# Patient Record
Sex: FEMALE | Race: BLACK OR AFRICAN AMERICAN | NOT HISPANIC OR LATINO | Employment: FULL TIME | URBAN - METROPOLITAN AREA
[De-identification: names, ages, dates, MRNs, and addresses within clinical notes are randomized per-mention and may not be internally consistent; named-entity substitution may affect disease eponyms.]

---

## 2017-03-23 PROBLEM — I48.91 NEW ONSET A-FIB: Status: ACTIVE | Noted: 2017-03-23

## 2017-03-23 PROBLEM — Z34.90 TERM PREGNANCY: Status: ACTIVE | Noted: 2017-03-23

## 2017-03-23 PROBLEM — O14.93 PRE-ECLAMPSIA IN THIRD TRIMESTER: Status: ACTIVE | Noted: 2017-03-23

## 2017-03-23 PROBLEM — O99.210 OBESITY AFFECTING PREGNANCY: Status: ACTIVE | Noted: 2017-03-23

## 2017-03-24 PROBLEM — I48.91 NEW ONSET A-FIB: Status: RESOLVED | Noted: 2017-03-23 | Resolved: 2017-03-24

## 2017-03-25 PROBLEM — I48.0 PAROXYSMAL ATRIAL FIBRILLATION: Status: ACTIVE | Noted: 2017-03-23

## 2017-03-30 PROBLEM — D64.9 ANEMIA: Status: ACTIVE | Noted: 2017-03-30

## 2017-04-01 PROBLEM — K35.30 ACUTE APPENDICITIS WITH LOCALIZED PERITONITIS: Status: ACTIVE | Noted: 2017-04-01

## 2017-04-01 PROBLEM — I10 HTN (HYPERTENSION): Status: ACTIVE | Noted: 2017-04-01

## 2017-06-26 PROBLEM — O14.93 PRE-ECLAMPSIA IN THIRD TRIMESTER: Status: RESOLVED | Noted: 2017-03-23 | Resolved: 2017-06-26

## 2018-09-17 ENCOUNTER — OFFICE VISIT (OUTPATIENT)
Dept: URGENT CARE | Facility: CLINIC | Age: 23
End: 2018-09-17
Payer: MEDICAID

## 2018-09-17 VITALS
DIASTOLIC BLOOD PRESSURE: 94 MMHG | OXYGEN SATURATION: 99 % | HEIGHT: 71 IN | HEART RATE: 94 BPM | SYSTOLIC BLOOD PRESSURE: 127 MMHG | TEMPERATURE: 98 F | BODY MASS INDEX: 41.02 KG/M2 | RESPIRATION RATE: 18 BRPM | WEIGHT: 293 LBS

## 2018-09-17 DIAGNOSIS — R11.2 NON-INTRACTABLE VOMITING WITH NAUSEA, UNSPECIFIED VOMITING TYPE: Primary | ICD-10-CM

## 2018-09-17 PROCEDURE — 99203 OFFICE O/P NEW LOW 30 MIN: CPT | Mod: S$GLB,,, | Performed by: FAMILY MEDICINE

## 2018-09-17 RX ORDER — PROMETHAZINE HYDROCHLORIDE 25 MG/ML
25 INJECTION, SOLUTION INTRAMUSCULAR; INTRAVENOUS
Status: COMPLETED | OUTPATIENT
Start: 2018-09-17 | End: 2018-09-17

## 2018-09-17 RX ORDER — PROMETHAZINE HYDROCHLORIDE 25 MG/1
25 TABLET ORAL EVERY 4 HOURS
Qty: 20 TABLET | Refills: 0 | Status: ON HOLD | OUTPATIENT
Start: 2018-09-17 | End: 2019-03-18 | Stop reason: HOSPADM

## 2018-09-17 RX ADMIN — PROMETHAZINE HYDROCHLORIDE 25 MG: 25 INJECTION, SOLUTION INTRAMUSCULAR; INTRAVENOUS at 02:09

## 2018-09-17 NOTE — PROGRESS NOTES
"Subjective:       Patient ID: Abena Arango is a 23 y.o. female.    Vitals:  height is 5' 11" (1.803 m) and weight is 133.4 kg (294 lb). Her temperature is 97.9 °F (36.6 °C). Her blood pressure is 127/94 (abnormal) and her pulse is 94. Her respiration is 18 and oxygen saturation is 99%.     Chief Complaint: Emesis    Patient has been nauseated, vomiting and constipated since Wednesday. Has been taking zofran.       Emesis    This is a new problem. The current episode started in the past 7 days. The problem occurs 5 to 10 times per day. The problem has been unchanged. The emesis has an appearance of bright red blood and stomach contents. There has been no fever. Associated symptoms include headaches and sweats. Pertinent negatives include no abdominal pain, chest pain, chills, diarrhea or fever. She has tried nothing for the symptoms.     Review of Systems   Constitution: Negative for chills and fever.   Cardiovascular: Negative for chest pain.   Respiratory: Negative for shortness of breath.    Musculoskeletal: Negative for back pain.   Gastrointestinal: Positive for constipation, nausea and vomiting. Negative for abdominal pain, diarrhea, hematochezia and melena.   Genitourinary: Negative for dysuria.   Neurological: Positive for headaches.       Objective:      Physical Exam   Constitutional: She is oriented to person, place, and time. She appears well-developed and well-nourished.   HENT:   Head: Normocephalic and atraumatic.   Right Ear: External ear normal.   Left Ear: External ear normal.   Nose: Nose normal.   Mouth/Throat: Mucous membranes are normal.   Eyes: Conjunctivae and lids are normal.   Neck: Trachea normal and full passive range of motion without pain. Neck supple.   Cardiovascular: Normal rate, regular rhythm and normal heart sounds.   Pulmonary/Chest: Effort normal and breath sounds normal. No respiratory distress.   Abdominal: Soft. Normal appearance and bowel sounds are normal. She exhibits no " distension, no abdominal bruit, no pulsatile midline mass and no mass. There is no tenderness.   Musculoskeletal: Normal range of motion. She exhibits no edema.   Neurological: She is alert and oriented to person, place, and time. She has normal strength.   Skin: Skin is warm, dry and intact. She is not diaphoretic. No pallor.   Psychiatric: She has a normal mood and affect. Her speech is normal and behavior is normal. Judgment and thought content normal. Cognition and memory are normal.   Nursing note and vitals reviewed.      Assessment:       1. Non-intractable vomiting with nausea, unspecified vomiting type        Plan:         Non-intractable vomiting with nausea, unspecified vomiting type    Other orders  -     promethazine injection 25 mg; Inject 1 mL (25 mg total) into the muscle one time.  -     promethazine (PHENERGAN) 25 MG tablet; Take 1 tablet (25 mg total) by mouth every 4 (four) hours.  Dispense: 20 tablet; Refill: 0

## 2018-09-17 NOTE — LETTER
September 17, 2018                   [facility]  Urgent Care  [address], [address]  [facility] [address]  Phone: [phone]  Fax: [phone]   September 17, 2018     Patient: Abena Arango   YOB: 1995   Date of Visit: 9/17/2018       To Whom it May Concern:    Abena Arango was seen in my clinic on 9/17/2018. Please excuse 9/16/18 - 9/19/18.     If you have any questions or concerns, please don't hesitate to call.    Sincerely,         Vianey Benton MA

## 2018-09-17 NOTE — PATIENT INSTRUCTIONS

## 2019-03-17 PROBLEM — E11.9 DIABETES MELLITUS, NEW ONSET: Status: ACTIVE | Noted: 2019-03-17

## 2019-03-17 PROBLEM — R11.10 VOMITING: Status: ACTIVE | Noted: 2019-03-17

## 2019-03-17 PROBLEM — R94.31 ABNORMAL ECG: Status: ACTIVE | Noted: 2019-03-17

## 2019-03-17 PROBLEM — K59.00 CONSTIPATION: Status: ACTIVE | Noted: 2019-03-17

## 2019-03-17 PROBLEM — R07.9 CHEST PAIN: Status: ACTIVE | Noted: 2019-03-17

## 2019-03-17 PROBLEM — E78.5 HYPERLIPIDEMIA: Status: ACTIVE | Noted: 2019-03-17

## 2019-03-17 PROBLEM — E66.01 SEVERE OBESITY (BMI >= 40): Status: ACTIVE | Noted: 2019-03-17

## 2019-05-02 ENCOUNTER — OFFICE VISIT (OUTPATIENT)
Dept: URGENT CARE | Facility: CLINIC | Age: 24
End: 2019-05-02
Payer: MEDICAID

## 2019-05-02 VITALS
HEART RATE: 98 BPM | OXYGEN SATURATION: 97 % | DIASTOLIC BLOOD PRESSURE: 95 MMHG | TEMPERATURE: 98 F | SYSTOLIC BLOOD PRESSURE: 142 MMHG | HEIGHT: 70 IN | BODY MASS INDEX: 39.22 KG/M2 | RESPIRATION RATE: 18 BRPM | WEIGHT: 274 LBS

## 2019-05-02 DIAGNOSIS — R11.2 NAUSEA AND VOMITING, INTRACTABILITY OF VOMITING NOT SPECIFIED, UNSPECIFIED VOMITING TYPE: ICD-10-CM

## 2019-05-02 DIAGNOSIS — E11.9 DIABETES MELLITUS, NEW ONSET: ICD-10-CM

## 2019-05-02 LAB
GLUCOSE SERPL-MCNC: 333 MG/DL (ref 70–110)
POC ANION GAP: 19 MMOL/L (ref 10–20)
POC BUN: 10 MMOL/L (ref 8–26)
POC CHLORIDE: 93 MMOL/L (ref 98–109)
POC CREATININE: 0.5 MG/DL (ref 0.6–1.3)
POC HEMATOCRIT: 45 %PCV (ref 37–47)
POC HEMOGLOBIN: 15.3 G/DL (ref 12.5–16)
POC ICA: 1.08 MMOL/L (ref 1.12–1.32)
POC POTASSIUM: 3.6 MMOL/L (ref 3.5–4.9)
POC SODIUM: 137 MMOL/L (ref 138–146)
POC TCO2: 30 MMOL/L (ref 24–29)

## 2019-05-02 PROCEDURE — 99214 OFFICE O/P EST MOD 30 MIN: CPT | Mod: S$GLB,,, | Performed by: PHYSICIAN ASSISTANT

## 2019-05-02 PROCEDURE — 99214 PR OFFICE/OUTPT VISIT, EST, LEVL IV, 30-39 MIN: ICD-10-PCS | Mod: S$GLB,,, | Performed by: PHYSICIAN ASSISTANT

## 2019-05-02 PROCEDURE — 80047 POCT CHEMISTRY PANEL: ICD-10-PCS | Mod: QW,S$GLB,, | Performed by: PHYSICIAN ASSISTANT

## 2019-05-02 PROCEDURE — 80047 BASIC METABLC PNL IONIZED CA: CPT | Mod: QW,S$GLB,, | Performed by: PHYSICIAN ASSISTANT

## 2019-05-02 RX ORDER — PROMETHAZINE HYDROCHLORIDE 25 MG/ML
25 INJECTION, SOLUTION INTRAMUSCULAR; INTRAVENOUS
Status: COMPLETED | OUTPATIENT
Start: 2019-05-02 | End: 2019-05-02

## 2019-05-02 RX ORDER — INSULIN LISPRO 100 [IU]/ML
4 INJECTION, SOLUTION INTRAVENOUS; SUBCUTANEOUS
Qty: 3 ML | Refills: 0 | Status: SHIPPED | OUTPATIENT
Start: 2019-05-02 | End: 2019-05-04 | Stop reason: SDUPTHER

## 2019-05-02 RX ORDER — INSULIN GLARGINE 100 [IU]/ML
25 INJECTION, SOLUTION SUBCUTANEOUS DAILY
Qty: 3 ML | Refills: 0 | Status: SHIPPED | OUTPATIENT
Start: 2019-05-02 | End: 2019-05-04 | Stop reason: SDUPTHER

## 2019-05-02 RX ORDER — LANCETS
1 EACH MISCELLANEOUS
Qty: 150 EACH | Refills: 3 | Status: SHIPPED | OUTPATIENT
Start: 2019-05-02 | End: 2023-12-11

## 2019-05-02 RX ORDER — PEN NEEDLE, DIABETIC 30 GX3/16"
1 NEEDLE, DISPOSABLE MISCELLANEOUS
Qty: 150 EACH | Refills: 0 | Status: SHIPPED | OUTPATIENT
Start: 2019-05-02 | End: 2023-12-11

## 2019-05-02 RX ORDER — PROMETHAZINE HYDROCHLORIDE 25 MG/1
25 TABLET ORAL EVERY 4 HOURS
Qty: 30 TABLET | Refills: 1 | Status: SHIPPED | OUTPATIENT
Start: 2019-05-02 | End: 2019-05-04

## 2019-05-02 RX ADMIN — PROMETHAZINE HYDROCHLORIDE 25 MG: 25 INJECTION, SOLUTION INTRAMUSCULAR; INTRAVENOUS at 06:05

## 2019-05-02 NOTE — LETTER
May 2, 2019      [facility] [address], [address]  [place] [place] [address]  Phone: [phone]  Fax: [phone]       Patient: Abena Arango   YOB: 1995  Date of Visit: 05/02/2019    To Whom It May Concern:    Vincent Arango  was at [facility] on 05/02/2019.  SHE  may return to work/school on 5/4/19. If you have any questions or concerns, or if I can be of further assistance, please do not hesitate to contact me.    Sincerely,    Zoe Lindquist MA

## 2019-05-02 NOTE — PROGRESS NOTES
"Subjective:       Patient ID: Abena Arango is a 23 y.o. female.    Vitals:  height is 5' 9.5" (1.765 m) and weight is 124.3 kg (274 lb). Her oral temperature is 97.8 °F (36.6 °C). Her blood pressure is 142/95 (abnormal) and her pulse is 98. Her respiration is 18 and oxygen saturation is 97%.     Chief Complaint: Emesis    Patient complains of nausea and vomiting for 2 days. She states that when she got home after work, around 3:00pm yesterday, she started vomiting.  She states that she has prescription strength zofran, which isn't helping. She states that in the past, she wasn't given an injection of phenergan, because it would make her sleepy.  She states that she was unable to sleep last night, due to being sick to her stomach.    Emesis    This is a new problem. The current episode started yesterday. The problem occurs 5 to 10 times per day. The problem has been gradually worsening. The emesis has an appearance of bile. There has been no fever. Associated symptoms include abdominal pain. Pertinent negatives include no arthralgias, chest pain, chills, coughing, diarrhea, dizziness, fever, headaches or myalgias. She has tried increased fluids for the symptoms. The treatment provided mild relief.       Constitution: Negative for chills, fatigue and fever.   HENT: Negative for congestion and sore throat.    Neck: Negative for painful lymph nodes.   Cardiovascular: Negative for chest pain and leg swelling.   Eyes: Negative for double vision and blurred vision.   Respiratory: Negative for cough and shortness of breath.    Gastrointestinal: Positive for abdominal pain, nausea and vomiting. Negative for diarrhea.   Genitourinary: Negative for dysuria, frequency, urgency and history of kidney stones.   Musculoskeletal: Negative for joint pain, joint swelling, muscle cramps and muscle ache.   Skin: Negative for color change, pale, rash and bruising.   Allergic/Immunologic: Negative for seasonal allergies.   Neurological: " Negative for dizziness, history of vertigo, light-headedness, passing out and headaches.   Hematologic/Lymphatic: Negative for swollen lymph nodes.   Psychiatric/Behavioral: Negative for nervous/anxious, sleep disturbance and depression. The patient is not nervous/anxious.        Objective:      Physical Exam   Constitutional: She is oriented to person, place, and time. She appears well-developed and well-nourished. She appears ill.   HENT:   Head: Normocephalic and atraumatic.   Right Ear: External ear normal.   Left Ear: External ear normal.   Nose: Nose normal.   Mouth/Throat: Mucous membranes are normal.   Eyes: Conjunctivae and lids are normal.   Neck: Trachea normal and full passive range of motion without pain. Neck supple.   Cardiovascular: Normal rate, regular rhythm and normal heart sounds.   Pulmonary/Chest: Effort normal and breath sounds normal. No respiratory distress.   Abdominal: Soft. Normal appearance and bowel sounds are normal. She exhibits no distension, no abdominal bruit, no pulsatile midline mass and no mass. There is tenderness in the epigastric area. There is no rigidity, no rebound, no guarding, no CVA tenderness, no tenderness at McBurney's point and negative Brito's sign.   Musculoskeletal: Normal range of motion. She exhibits no edema.   Neurological: She is alert and oriented to person, place, and time. She has normal strength.   Skin: Skin is warm, dry and intact. She is not diaphoretic. No pallor.   Psychiatric: She has a normal mood and affect. Her speech is normal and behavior is normal. Judgment and thought content normal. Cognition and memory are normal.   Nursing note and vitals reviewed.      Assessment:       1. Insulin dependent diabetes mellitus with complications    2. Nausea and vomiting, intractability of vomiting not specified, unspecified vomiting type    3. Diabetes mellitus, new onset        Plan:         Insulin dependent diabetes mellitus with complications  -      "insulin lispro (ADMELOG SOLOSTAR U-100 INSULIN) 100 unit/mL pen; Inject 4 Units into the skin 3 (three) times daily with meals.  Dispense: 3.6 mL; Refill: 0  -     insulin (BASAGLAR KWIKPEN U-100 INSULIN) glargine 100 units/mL (3mL) SubQ pen; Inject 25 Units into the skin once daily.  Dispense: 1 Syringe; Refill: 0  -     lancets (ACCU-CHEK SOFTCLIX LANCETS) Misc; 1 each by Misc.(Non-Drug; Combo Route) route 4 (four) times daily before meals and nightly.  Dispense: 150 each; Refill: 3  -     blood sugar diagnostic Strp; 1 strip by Misc.(Non-Drug; Combo Route) route 4 (four) times daily before meals and nightly.  Dispense: 150 strip; Refill: 0  -     pen needle, diabetic (BD ULTRA-FINE CRISTINA PEN NEEDLE) 32 gauge x 5/32" Ndle; 1 pen by Misc.(Non-Drug; Combo Route) route 4 (four) times daily before meals and nightly.  Dispense: 150 each; Refill: 0    Nausea and vomiting, intractability of vomiting not specified, unspecified vomiting type  -     POCT urine pregnancy  -     POCT Chemistry Panel  -     promethazine injection 25 mg  -     promethazine (PHENERGAN) 25 MG tablet; Take 1 tablet (25 mg total) by mouth every 4 (four) hours.  Dispense: 30 tablet; Refill: 1  -     ranitidine (ZANTAC) 300 MG tablet; Take 1 tablet (300 mg total) by mouth nightly.  Dispense: 30 tablet; Refill: 1    Diabetes mellitus, new onset  -     lancets (ACCU-CHEK SOFTCLIX LANCETS) Misc; 1 each by Misc.(Non-Drug; Combo Route) route 4 (four) times daily before meals and nightly.  Dispense: 150 each; Refill: 3      Results for orders placed or performed in visit on 05/02/19   POCT Chemistry Panel   Result Value Ref Range    POC Sodium 137 (A) 138 - 146 MMOL/L    POC Potassium 3.6 3.5 - 4.9 MMOL/L    POC Chloride 93 (A) 98 - 109 MMOL/L    POC BUN 10 (A) 8 - 26 MMOL/L    POC Glucose 333 (A) 70 - 110 MG/DL    POC Creatinine 0.5 (A) 0.6 - 1.3 mg/dL    POC iCA 1.08 (A) 1.12 - 1.32 MMOL/L    POC TCO2 30 (A) 24 - 29 MMOL/L    POC Hematocrit 45 37 - 47 " "%PCV    POC Hemoglobin 15.3 12.5 - 16 g/dL    POC Anion Gap 19 10.0 - 20 MMOL/L        Patient declined UPT "I can't pee".    Discussed lab results with patient and need for IDDM maintenance and attention. Discussed labs and case with Dr. Mathews. If patient continues to vomit, she'll need to go to ED. It is imperative that she begins to manage her IDDM. She v/u.      Patient Instructions   Primary care at [facility]   Phone: [phone]      Managing Type 1 Diabetes    Diabetes is a long-term chronic condition. Managing your diabetes means making some changes that may be hard. Your healthcare provider, nurse, diabetes educator, and others can help you.  Managing type 1 diabetes means balancing your insulin with diet and activity. You will have to check your blood sugar and at times, ketones. You will also have to work with your healthcare provider to prevent complications.  Inject your insulin  You will need to inject insulin. Or, you may have an insulin pump. The insulin moves the sugar in your blood into your cells.  Insulin comes in several different types, depending on how quickly it begins working and how long the effect lasts. There is also insulin that is a combination of more than one type of insulin. Your healthcare provider, nurse, or a diabetes educator can help you with injections.  Make sure you use insulin as instructed by your healthcare provider. He or she may change the type, timing, or dose, if your blood sugar is not well controlled.  And, make sure your insulin is stored correctly and is not past the expiration date.  Eat healthy  A healthy, well-planned diet helps to control the amount of sugar in your blood. It also helps you stay at a healthy weight.  Your healthcare provider, nurse, a dietitian, or diabetes educator will help you create a plan that works for you. You don't have to give up all the foods you like. Having meals and snacks with vegetables, fruits, lean meats, or " other healthy proteins, whole grains, and low or no-fat dairy products will help control your blood sugar.   Be physically active  Being active helps your body use insulin to turn food into energy.  Ask your healthcare provider to work with you to create an activity program that's right for you. Your activity program is based on your age, general health, and types of activity that you enjoy. Start slowly, but aim for at least 30 minutes of exercise or activity on most days.  Monitor your blood sugar  Your healthcare provider will give you instructions about checking your blood sugar at home. Checking it tells you if your blood sugar is in your target range. Having blood sugar levels in your target range means that you are managing your diabetes well.  Your healthcare provider will tell you what is too high and too low for you. Call your healthcare provider if your blood sugar is out of that range. Know how to recognize and respond quickly to symptoms of low blood sugar (such as sweating, trembling, or confusion).  Your healthcare provider may also tell you to check your blood sugar more often when you are sick. At certain times, for example, when you have a cold or the flu, you may need to check it more often.  If your blood sugar levels are often too high or too low, your healthcare provider may suggest changes to your diet or activity level. He or she may also adjust your medicine.  Check for ketones  You may sometimes need to check your urine for ketones. Ketones are chemicals that are produced when fat, instead of glucose, is burned for energy (ketosis). To check for ketones, follow instructions that come with the strips and from your healthcare provider, nurse, or diabetes educator. If ketones are present, always call your healthcare provider right away. Some people also use home glucose monitors to check the blood for ketosis. Ask your healthcare provider, nurse, or diabetes educator for more  information.  Take care of yourself  When you have diabetes, you may be more likely to develop other health problems. They include foot, eye, heart, and kidney problems. By controlling your blood sugar, and taking good care of yourself, you can help to prevent these problems. Your healthcare provider, nurse, diabetes educator, and others can help you.  · Checkups. You should have regular checkups with your healthcare provider. At those visits, you will have a physical exam that includes checking your feet. Your healthcare provider will also check your blood pressure and weight.  · Other exams. You should also have complete eye, foot, and dental exams at least once every year.  · Lab tests. You will have blood and urine tests.   ¨ At least two times a year, your healthcare provider will check your hemoglobin A1C. This blood test shows how well you have been controlling your blood sugar over 2 to 3 months. The results help your healthcare provider manage your diabetes.  ¨ You will also have other lab tests. For example, to check for kidney problems and abnormal cholesterol levels.  · Smoking. If you smoke, you must quit. Smoking increases the chance that you will develop complications from diabetes. Ask your healthcare provider about ways to quit.  · Vaccines. Get a yearly flu shot. And, ask your healthcare provider about vaccines to prevent pneumonia and hepatitis B.  Stress and depression  Most people have challenges throughout their lives. Living with diabetes, or any serious condition, can increase your stress and make you feel a lot of different emotions. In diabetes, feeling stressed or depressed can actually affect your blood sugar levels.  If you are having trouble dealing with diabetes, tell your healthcare provider. He or she can help or refer you to other healthcare providers or programs.  Support and resources  Know where you can get help. You can try the following:  · Support. Ask family and friends to  support your efforts to take care of yourself. Or look for a diabetes support group locally or on the Internet. (Check the Connect with Others on www.diabetes.org.)  · Counseling. Talk with a , psychologist, psychiatrist, or other counselor.  · Information. Contact the American Diabetes Association at 104-914-7739 or www.diabetes.org.  Date Last Reviewed: 6/1/2016  © 2291-9380 [employer]. [address] [place] [address]. All rights reserved. This information is not intended as a substitute for professional medical care. Always follow your healthcare professional's instructions.        Step-by-Step  Checking Your Blood Sugar    Date Last Reviewed: 5/1/2016  © 4816-3991 [employer]. [address] [place] [address]. All rights reserved. This information is not intended as a substitute for professional medical care. Always follow your healthcare professional's instructions.        Long-Term Complications of Diabetes    Diabetes can cause health problems over time. These are called complications. They are more likely to happen if your blood sugar is often too high. Over time, high blood sugar can damage blood vessels in your body. It is important to keep your blood sugar in your target range. This can help prevent or delay complications from diabetes.  Possible complications  Complications of diabetes include:  · Eye problems, including damage to the blood vessels in the eyes (retinopathy), pressure in the eye (glaucoma), and clouding of the eyes lens (a cataract). Eye problems can eventually lead to irreversible blindness.   · Tooth and gum problems (periodontal disease), causing loss of teeth and bone  · Blood vessel (vascular) disease leading to circulation problems, heart attack or stroke, or a need for amputation of a limb   · Problems with sexual function leading to erectile dysfunction in men and sexual discomfort in women   · Kidney disease  (nephropathy) can eventually lead to kidney failure, which may require dialysis or kidney transplant   · Nerve problems (neuropathy), causing pain or loss of feeling in your feet and other parts of your body, potentially leading to an amputation of a limb   · High blood pressure (hypertension), putting strain on your heart and blood vessels  · Serious infections, possibly leading to loss of toes, feet, or limbs  How to avoid complications  The serious consequences of these complications may be avoidable for most people with diabetes by managing your blood glucose, blood pressure, and cholesterol levels. This can help you feel better and stay healthy. You can manage diabetes by tracking your blood sugar. You can also eat healthy and exercise to avoid gaining weight. And you should take medicine if directed by your healthcare provider.  Date Last Reviewed: 5/1/2016 © 2000-2017 [employer]. [address] [place] [address], [place] [address]. All rights reserved. This information is not intended as a substitute for professional medical care. Always follow your healthcare professional's instructions.        Diabetes: Activity Tips    Being more active can help you manage your diabetes. The tips on this sheet can help you get the most from your exercise. They can also help you stay safe.  Staying Active  Its important for adults to spend less time sitting and being inactive. This is especially true if you have type 2 diabetes. When you are sitting for long periods of time, get up for short sessions of light activity every 30 minutes.  You should aim for at least 150 minutes a week of exercise or physical activity. Dont let more than 2 days go by without being active.  Benefit from briskness  Brisk activity gets your heart beating faster. This can help you increase your fitness, lose extra weight, and manage your blood sugar level. Try brisk walking. Or, if you have foot or leg problems, you can try swimming or  bike riding. You can break up your exercise into chunks throughout the day. Work up to at least 30 minutes of steady, brisk exercise on most days.  Warm up and cool down  Warming up and cooling down reduce your risk of injury. They also help limit muscle soreness. Do a mild version of your activity for 5 minutes before and after your routine. You can also learn stretches that will help keep your muscles loose. Your healthcare provider may show you good ways to warm up and stretch.  Do the talk-sing test  The talk-sing test is a simple way to tell how hard youre exercising. If you can talk while exercising, youre in a safe range. If youre out of breath, slow down. If you can carry a tune, its time to  the pace. Walk up a hill. Increase the resistance on your stationary bike. Or swim faster.  What about eating?  You may be told to plan your exercise for 1 to 2 hours after a meal. In most cases, you dont need to eat while being active. If you take insulin or medicine that can cause low blood sugar, test your blood sugar before exercising. And carry a fast-acting sugar that will raise your blood sugar level quickly. This includes glucose tablets or hard candy. Use it if you feel low blood sugar symptoms.  Safety tips  These tips can help you stay safe as you become fit:  · Exercise with a friend or carry a cell phone if you have one.  · Carry or wear identification, such as a necklace or bracelet, that says you have diabetes.  · Use the proper footwear and safety equipment for your activity.  · Drink water before, during, and after exercise.  · Dress properly for the weather.  · Dont exercise in very hot or very cold weather.  · Dont exercise if you are sick.  · If you are instructed to do so, test your blood sugar before and after you exercise. Have a small carbohydrate snack if your blood sugar is low before you start exercising.   When to stop exercising and call your healthcare provider  Stop  exercising and call your healthcare provider right away if you notice any of the following:  · Pain, pressure, tightness, or heaviness in the chest  · Pain or heaviness in the neck, shoulders, back, arms, legs, or feet  · Unusual shortness of breath  · Dizziness or lightheadedness  · Unusually rapid or slow pulse  · Increased joint or muscle pain  · Nausea or vomiting  Date Last Reviewed: 5/1/2016  © 2679-3898 [employer]. [address] [place] [address]. All rights reserved. This information is not intended as a substitute for professional medical care. Always follow your healthcare professional's instructions.       If not allergic,take tylenol (acetominophen) for fever control, chills, or body aches every 4 hours. Do not exceed 4000 mg/ day.If not allergic, take Motrin (Ibuprofen) every 4 hours for fever, chills, pain or inflammation. Do not exceed 2400 mg/day. You can alternate taking tylenol and motrin.  If you were prescribed a narcotic medication, do not drive or operate heavy equipment or machinery while taking these medications.  You must understand that you've received an Urgent Care treatment only and that you may be released before all your medical problems are known or treated. You, the patient, will arrange for follow up care as instructed.  Follow up with your PCP or specialty clinic as directed in the next 1-2 weeks if not improved or as needed.  You can call [phone] to schedule an appointment with the appropriate provider.  If your condition worsens we recommend that you receive another evaluation at the emergency room immediately or contact your primary medical clinics after hours call service to discuss your concerns.  Please return here or go to the Emergency Department for any concerns or worsening of condition.

## 2019-05-03 NOTE — PATIENT INSTRUCTIONS
Primary care at [facility]   Phone: [phone]      Managing Type 1 Diabetes    Diabetes is a long-term chronic condition. Managing your diabetes means making some changes that may be hard. Your healthcare provider, nurse, diabetes educator, and others can help you.  Managing type 1 diabetes means balancing your insulin with diet and activity. You will have to check your blood sugar and at times, ketones. You will also have to work with your healthcare provider to prevent complications.  Inject your insulin  You will need to inject insulin. Or, you may have an insulin pump. The insulin moves the sugar in your blood into your cells.  Insulin comes in several different types, depending on how quickly it begins working and how long the effect lasts. There is also insulin that is a combination of more than one type of insulin. Your healthcare provider, nurse, or a diabetes educator can help you with injections.  Make sure you use insulin as instructed by your healthcare provider. He or she may change the type, timing, or dose, if your blood sugar is not well controlled.  And, make sure your insulin is stored correctly and is not past the expiration date.  Eat healthy  A healthy, well-planned diet helps to control the amount of sugar in your blood. It also helps you stay at a healthy weight.  Your healthcare provider, nurse, a dietitian, or diabetes educator will help you create a plan that works for you. You don't have to give up all the foods you like. Having meals and snacks with vegetables, fruits, lean meats, or other healthy proteins, whole grains, and low or no-fat dairy products will help control your blood sugar.   Be physically active  Being active helps your body use insulin to turn food into energy.  Ask your healthcare provider to work with you to create an activity program that's right for you. Your activity program is based on your age, general health, and types of activity that you enjoy.  Start slowly, but aim for at least 30 minutes of exercise or activity on most days.  Monitor your blood sugar  Your healthcare provider will give you instructions about checking your blood sugar at home. Checking it tells you if your blood sugar is in your target range. Having blood sugar levels in your target range means that you are managing your diabetes well.  Your healthcare provider will tell you what is too high and too low for you. Call your healthcare provider if your blood sugar is out of that range. Know how to recognize and respond quickly to symptoms of low blood sugar (such as sweating, trembling, or confusion).  Your healthcare provider may also tell you to check your blood sugar more often when you are sick. At certain times, for example, when you have a cold or the flu, you may need to check it more often.  If your blood sugar levels are often too high or too low, your healthcare provider may suggest changes to your diet or activity level. He or she may also adjust your medicine.  Check for ketones  You may sometimes need to check your urine for ketones. Ketones are chemicals that are produced when fat, instead of glucose, is burned for energy (ketosis). To check for ketones, follow instructions that come with the strips and from your healthcare provider, nurse, or diabetes educator. If ketones are present, always call your healthcare provider right away. Some people also use home glucose monitors to check the blood for ketosis. Ask your healthcare provider, nurse, or diabetes educator for more information.  Take care of yourself  When you have diabetes, you may be more likely to develop other health problems. They include foot, eye, heart, and kidney problems. By controlling your blood sugar, and taking good care of yourself, you can help to prevent these problems. Your healthcare provider, nurse, diabetes educator, and others can help you.  · Checkups. You should have regular checkups with your  healthcare provider. At those visits, you will have a physical exam that includes checking your feet. Your healthcare provider will also check your blood pressure and weight.  · Other exams. You should also have complete eye, foot, and dental exams at least once every year.  · Lab tests. You will have blood and urine tests.   ¨ At least two times a year, your healthcare provider will check your hemoglobin A1C. This blood test shows how well you have been controlling your blood sugar over 2 to 3 months. The results help your healthcare provider manage your diabetes.  ¨ You will also have other lab tests. For example, to check for kidney problems and abnormal cholesterol levels.  · Smoking. If you smoke, you must quit. Smoking increases the chance that you will develop complications from diabetes. Ask your healthcare provider about ways to quit.  · Vaccines. Get a yearly flu shot. And, ask your healthcare provider about vaccines to prevent pneumonia and hepatitis B.  Stress and depression  Most people have challenges throughout their lives. Living with diabetes, or any serious condition, can increase your stress and make you feel a lot of different emotions. In diabetes, feeling stressed or depressed can actually affect your blood sugar levels.  If you are having trouble dealing with diabetes, tell your healthcare provider. He or she can help or refer you to other healthcare providers or programs.  Support and resources  Know where you can get help. You can try the following:  · Support. Ask family and friends to support your efforts to take care of yourself. Or look for a diabetes support group locally or on the Internet. (Check the Connect with Others on www.diabetes.org.)  · Counseling. Talk with a , psychologist, psychiatrist, or other counselor.  · Information. Contact the American Diabetes Association at 211-728-5204 or www.diabetes.org.  Date Last Reviewed: 6/1/2016  © 3078-9210 [employer]. [address] [place] [address]. All rights reserved. This information is not intended as a substitute for professional medical care. Always follow your healthcare professional's instructions.        Step-by-Step  Checking Your Blood Sugar    Date Last Reviewed: 5/1/2016  © 4323-4445 [employer]. [address] [place] [address]. All rights reserved. This information is not intended as a substitute for professional medical care. Always follow your healthcare professional's instructions.        Long-Term Complications of Diabetes    Diabetes can cause health problems over time. These are called complications. They are more likely to happen if your blood sugar is often too high. Over time, high blood sugar can damage blood vessels in your body. It is important to keep your blood sugar in your target range. This can help prevent or delay complications from diabetes.  Possible complications  Complications of diabetes include:  · Eye problems, including damage to the blood vessels in the eyes (retinopathy), pressure in the eye (glaucoma), and clouding of the eyes lens (a cataract). Eye problems can eventually lead to irreversible blindness.   · Tooth and gum problems (periodontal disease), causing loss of teeth and bone  · Blood vessel (vascular) disease leading to circulation problems, heart attack or stroke, or a need for amputation of a limb   · Problems with sexual function leading to erectile dysfunction in men and sexual discomfort in women   · Kidney disease (nephropathy) can eventually lead to kidney failure, which may require dialysis or kidney transplant   · Nerve problems (neuropathy), causing pain or loss of feeling in your feet and other parts of your body, potentially leading to an amputation of a limb   · High blood pressure (hypertension), putting strain on your heart and blood vessels  · Serious infections, possibly leading to loss of toes, feet, or  limbs  How to avoid complications  The serious consequences of these complications may be avoidable for most people with diabetes by managing your blood glucose, blood pressure, and cholesterol levels. This can help you feel better and stay healthy. You can manage diabetes by tracking your blood sugar. You can also eat healthy and exercise to avoid gaining weight. And you should take medicine if directed by your healthcare provider.  Date Last Reviewed: 5/1/2016  © 5088-6215 [employer]. [address] [place] [address], [place] [address]. All rights reserved. This information is not intended as a substitute for professional medical care. Always follow your healthcare professional's instructions.        Diabetes: Activity Tips    Being more active can help you manage your diabetes. The tips on this sheet can help you get the most from your exercise. They can also help you stay safe.  Staying Active  Its important for adults to spend less time sitting and being inactive. This is especially true if you have type 2 diabetes. When you are sitting for long periods of time, get up for short sessions of light activity every 30 minutes.  You should aim for at least 150 minutes a week of exercise or physical activity. Dont let more than 2 days go by without being active.  Benefit from briskness  Brisk activity gets your heart beating faster. This can help you increase your fitness, lose extra weight, and manage your blood sugar level. Try brisk walking. Or, if you have foot or leg problems, you can try swimming or bike riding. You can break up your exercise into chunks throughout the day. Work up to at least 30 minutes of steady, brisk exercise on most days.  Warm up and cool down  Warming up and cooling down reduce your risk of injury. They also help limit muscle soreness. Do a mild version of your activity for 5 minutes before and after your routine. You can also learn stretches that will help keep your muscles  loose. Your healthcare provider may show you good ways to warm up and stretch.  Do the talk-sing test  The talk-sing test is a simple way to tell how hard youre exercising. If you can talk while exercising, youre in a safe range. If youre out of breath, slow down. If you can carry a tune, its time to  the pace. Walk up a hill. Increase the resistance on your stationary bike. Or swim faster.  What about eating?  You may be told to plan your exercise for 1 to 2 hours after a meal. In most cases, you dont need to eat while being active. If you take insulin or medicine that can cause low blood sugar, test your blood sugar before exercising. And carry a fast-acting sugar that will raise your blood sugar level quickly. This includes glucose tablets or hard candy. Use it if you feel low blood sugar symptoms.  Safety tips  These tips can help you stay safe as you become fit:  · Exercise with a friend or carry a cell phone if you have one.  · Carry or wear identification, such as a necklace or bracelet, that says you have diabetes.  · Use the proper footwear and safety equipment for your activity.  · Drink water before, during, and after exercise.  · Dress properly for the weather.  · Dont exercise in very hot or very cold weather.  · Dont exercise if you are sick.  · If you are instructed to do so, test your blood sugar before and after you exercise. Have a small carbohydrate snack if your blood sugar is low before you start exercising.   When to stop exercising and call your healthcare provider  Stop exercising and call your healthcare provider right away if you notice any of the following:  · Pain, pressure, tightness, or heaviness in the chest  · Pain or heaviness in the neck, shoulders, back, arms, legs, or feet  · Unusual shortness of breath  · Dizziness or lightheadedness  · Unusually rapid or slow pulse  · Increased joint or muscle pain  · Nausea or vomiting  Date Last Reviewed: 5/1/2016  © 2652-5092  [employer]. [address] [place] [address], [place] [address]. All rights reserved. This information is not intended as a substitute for professional medical care. Always follow your healthcare professional's instructions.       If not allergic,take tylenol (acetominophen) for fever control, chills, or body aches every 4 hours. Do not exceed 4000 mg/ day.If not allergic, take Motrin (Ibuprofen) every 4 hours for fever, chills, pain or inflammation. Do not exceed 2400 mg/day. You can alternate taking tylenol and motrin.  If you were prescribed a narcotic medication, do not drive or operate heavy equipment or machinery while taking these medications.  You must understand that you've received an Urgent Care treatment only and that you may be released before all your medical problems are known or treated. You, the patient, will arrange for follow up care as instructed.  Follow up with your PCP or specialty clinic as directed in the next 1-2 weeks if not improved or as needed.  You can call [phone] to schedule an appointment with the appropriate provider.  If your condition worsens we recommend that you receive another evaluation at the emergency room immediately or contact your primary medical clinics after hours call service to discuss your concerns.  Please return here or go to the Emergency Department for any concerns or worsening of condition.

## 2019-11-18 ENCOUNTER — OFFICE VISIT (OUTPATIENT)
Dept: URGENT CARE | Facility: CLINIC | Age: 24
End: 2019-11-18
Payer: MEDICAID

## 2019-11-18 VITALS
HEIGHT: 68 IN | SYSTOLIC BLOOD PRESSURE: 117 MMHG | WEIGHT: 250 LBS | BODY MASS INDEX: 37.89 KG/M2 | DIASTOLIC BLOOD PRESSURE: 79 MMHG | TEMPERATURE: 98 F | OXYGEN SATURATION: 98 % | RESPIRATION RATE: 18 BRPM | HEART RATE: 94 BPM

## 2019-11-18 DIAGNOSIS — B34.9 VIRAL SYNDROME: Primary | ICD-10-CM

## 2019-11-18 DIAGNOSIS — R68.89 FLU-LIKE SYMPTOMS: ICD-10-CM

## 2019-11-18 DIAGNOSIS — R10.9 ABDOMINAL CRAMPS: ICD-10-CM

## 2019-11-18 DIAGNOSIS — R11.2 NAUSEA AND VOMITING, INTRACTABILITY OF VOMITING NOT SPECIFIED, UNSPECIFIED VOMITING TYPE: ICD-10-CM

## 2019-11-18 LAB
CTP QC/QA: YES
FLUAV AG NPH QL: NEGATIVE
FLUBV AG NPH QL: NEGATIVE

## 2019-11-18 PROCEDURE — 87804 POCT INFLUENZA A/B: ICD-10-PCS | Mod: QW,S$GLB,, | Performed by: PHYSICIAN ASSISTANT

## 2019-11-18 PROCEDURE — 99214 OFFICE O/P EST MOD 30 MIN: CPT | Mod: 25,S$GLB,, | Performed by: PHYSICIAN ASSISTANT

## 2019-11-18 PROCEDURE — 99214 PR OFFICE/OUTPT VISIT, EST, LEVL IV, 30-39 MIN: ICD-10-PCS | Mod: 25,S$GLB,, | Performed by: PHYSICIAN ASSISTANT

## 2019-11-18 PROCEDURE — 87804 INFLUENZA ASSAY W/OPTIC: CPT | Mod: QW,S$GLB,, | Performed by: PHYSICIAN ASSISTANT

## 2019-11-18 RX ORDER — PROMETHAZINE HYDROCHLORIDE 25 MG/ML
12.5 INJECTION, SOLUTION INTRAMUSCULAR; INTRAVENOUS
Status: COMPLETED | OUTPATIENT
Start: 2019-11-18 | End: 2019-11-18

## 2019-11-18 RX ORDER — DICYCLOMINE HYDROCHLORIDE 20 MG/1
20 TABLET ORAL 3 TIMES DAILY PRN
Qty: 30 TABLET | Refills: 0 | Status: SHIPPED | OUTPATIENT
Start: 2019-11-18 | End: 2019-11-25

## 2019-11-18 RX ORDER — PROMETHAZINE HYDROCHLORIDE 12.5 MG/1
12.5 TABLET ORAL EVERY 6 HOURS PRN
Qty: 12 TABLET | Refills: 0 | Status: SHIPPED | OUTPATIENT
Start: 2019-11-18 | End: 2021-03-31

## 2019-11-18 RX ADMIN — PROMETHAZINE HYDROCHLORIDE 12.5 MG: 25 INJECTION, SOLUTION INTRAMUSCULAR; INTRAVENOUS at 12:11

## 2019-11-18 NOTE — PROGRESS NOTES
"Subjective:       Patient ID: Abena Arango is a 24 y.o. female.    Vitals:  height is 5' 8" (1.727 m) and weight is 113.4 kg (250 lb). Her tympanic temperature is 98.1 °F (36.7 °C). Her blood pressure is 117/79 and her pulse is 94. Her respiration is 18 and oxygen saturation is 98%.     Chief Complaint: Nausea    Nausea    This is a new problem. The current episode started today. The problem occurs constantly. The problem has been gradually worsening. Associated symptoms include abdominal pain (cramps), chills, fatigue, myalgias, nausea, a sore throat and vomiting. Pertinent negatives include no anorexia, arthralgias, change in bowel habit, chest pain, congestion, coughing, diaphoresis, fever, headaches, joint swelling, neck pain, rash, swollen glands, urinary symptoms or visual change. Treatments tried: zofran 8mg.  The treatment provided mild relief.       Constitution: Positive for chills and fatigue. Negative for sweating and fever.   HENT: Positive for postnasal drip and sore throat. Negative for ear pain, congestion, nosebleeds, foreign body in nose, sinus pain, sinus pressure, trouble swallowing and voice change.    Neck: Negative for neck pain, neck stiffness, painful lymph nodes and neck swelling.   Cardiovascular: Negative for chest pain, leg swelling, palpitations, sob on exertion and passing out.   Eyes: Negative for eye pain, eye redness, photophobia, double vision, blurred vision and eyelid swelling.   Respiratory: Negative for chest tightness, cough, sputum production, bloody sputum, shortness of breath and stridor.    Gastrointestinal: Positive for abdominal pain (cramps), history of abdominal surgery, nausea and vomiting. Negative for abdominal trauma, abdominal bloating, constipation, diarrhea, bright red blood in stool, dark colored stools, rectal bleeding, rectal pain, hemorrhoids, heartburn and bowel incontinence.   Musculoskeletal: Positive for muscle ache. Negative for joint pain, joint " swelling, abnormal ROM of joint, back pain and muscle cramps.   Skin: Negative for rash.   Neurological: Negative for dizziness, light-headedness, passing out, loss of balance, headaches, altered mental status, loss of consciousness and seizures.   Hematologic/Lymphatic: Negative for swollen lymph nodes.   Psychiatric/Behavioral: Negative for altered mental status and nervous/anxious. The patient is not nervous/anxious.        Objective:      Physical Exam   Constitutional: She is oriented to person, place, and time. She appears well-developed and well-nourished. She is cooperative.  Non-toxic appearance. She does not have a sickly appearance. She does not appear ill. No distress.   HENT:   Head: Normocephalic and atraumatic.   Right Ear: Hearing, tympanic membrane, external ear and ear canal normal.   Left Ear: Hearing, tympanic membrane, external ear and ear canal normal.   Nose: Nose normal. No mucosal edema, rhinorrhea or nasal deformity. No epistaxis. Right sinus exhibits no maxillary sinus tenderness and no frontal sinus tenderness. Left sinus exhibits no maxillary sinus tenderness and no frontal sinus tenderness.   Mouth/Throat: Uvula is midline, oropharynx is clear and moist and mucous membranes are normal. No trismus in the jaw. Normal dentition. No uvula swelling. No oropharyngeal exudate, posterior oropharyngeal edema or posterior oropharyngeal erythema.   Eyes: Conjunctivae and lids are normal. No scleral icterus.   Neck: Trachea normal, normal range of motion, full passive range of motion without pain and phonation normal. Neck supple. No neck rigidity. No edema and no erythema present.   Cardiovascular: Normal rate, regular rhythm, normal heart sounds, intact distal pulses and normal pulses.   Pulmonary/Chest: Effort normal and breath sounds normal. No respiratory distress. She has no decreased breath sounds. She has no rhonchi.   Abdominal: Soft. Normal appearance and bowel sounds are normal. She  exhibits no distension, no abdominal bruit, no pulsatile midline mass and no mass. There is generalized tenderness. There is no rigidity, no rebound, no guarding, no CVA tenderness, no tenderness at McBurney's point and negative Brito's sign.   Musculoskeletal: Normal range of motion. She exhibits no edema or deformity.   Lymphadenopathy:     She has no cervical adenopathy.   Neurological: She is alert and oriented to person, place, and time. She has normal strength. She exhibits normal muscle tone. Coordination normal.   Skin: Skin is warm, dry, intact, not diaphoretic, not pale and no rash. Capillary refill takes less than 2 seconds.   Psychiatric: She has a normal mood and affect. Her speech is normal and behavior is normal. Judgment and thought content normal. Cognition and memory are normal.   Nursing note and vitals reviewed.    Results for orders placed or performed in visit on 11/18/19   POCT Influenza A/B   Result Value Ref Range    Rapid Influenza A Ag Negative Negative    Rapid Influenza B Ag Negative Negative     Acceptable Yes            Assessment:       1. Viral syndrome    2. Flu-like symptoms    3. Abdominal cramps    4. Nausea and vomiting, intractability of vomiting not specified, unspecified vomiting type        Plan:     Patient is not driving home and patient's sister will bring her home by private vehicle, so phenergan 12.5mg IM ok to give at this time. Patient aware, verbalized understanding and agreed with plan of care.    Viral syndrome    Flu-like symptoms  -     POCT Influenza A/B    Abdominal cramps  -     dicyclomine (BENTYL) 20 mg tablet; Take 1 tablet (20 mg total) by mouth 3 (three) times daily as needed (for abdominal cramping).  Dispense: 30 tablet; Refill: 0    Nausea and vomiting, intractability of vomiting not specified, unspecified vomiting type  -     promethazine (PHENERGAN) 12.5 MG Tab; Take 1 tablet (12.5 mg total) by mouth every 6 (six) hours as needed (for  nausea and vomiting).  Dispense: 12 tablet; Refill: 0  -     promethazine injection 12.5 mg      Patient Instructions   You must understand that you've received an Urgent Care treatment only and that you may be released before all your medical problems are known or treated. You, the patient, will arrange for follow up care as instructed.  Follow up with your PCP or specialty clinic as directed if not improved or as needed. You can call [phone] to schedule an appointment with the appropriate provider.  If your condition worsens we recommend that you receive another evaluation at the Emergency Department for any concerns or worsening of condition.  Parent aware and verbalized understanding.    You tested NEGATIVE for flu today.  Patient aware and verbalized understanding.    Your symptoms are viral in nature.  Rest and hydrate with plenty of fluids.  Recommended very bland diet for the next 24-48 hours.   Avoid spicy foods and fast food/greasy food until symptoms have resolved.  Bentyl RX as needed for abdominal cramping/spasms.  Zofran and Phenergan RX as needed for nausea/vomiting.  OTC Tylenol or Ibuprofen every 4-6 hours as needed for headache, fever or pain.  Advised patient to follow-up appointment with PCP next week for further evaluation.  ER Precautions given to patient.  Patient aware and verbalized understanding.      Diet for Vomiting or Diarrhea (Adult)    Your symptoms may return or get worse after eating certain foods listed below. If this happens, stop eating these foods until your symptoms ease and you feel better.  Once the vomiting stops, follow the steps below.   During the first 12 to 24 hours  During the first 12 to 24 hours, follow this diet:  · Drinks. Plain water, sport drinks like electrolyte solutions, soft drinks without caffeine, mineral water (plain or flavored), clear fruit juices, and decaffeinated tea and coffee.  · Soups. Clear broth.  · Desserts. Plain gelatin, popsicles, and  "fruit juice bars. As you feel better, you may add 6 to 8 ounces of yogurt per day. If you have diarrhea, don't have foods or drinks that contain sugar, high-fructose corn syrup, or sugar alcohols.  During the next 24 hours  During the next 24 hours you may add the following to the above:  · Hot cereal, plain toast, bread, rolls, and crackers  · Plain noodles, rice, mashed potatoes, and chicken noodle or rice soup  · Unsweetened canned fruit (but not pineapple) and bananas  Don't eat more than 15 grams of fat a day. Do this by staying away from margarine, butter, oils, mayonnaise, sauces, gravies, fried foods, peanut butter, meat, poultry, and fish.  Don't eat much fiber. Stay away from raw or cooked vegetables, fresh fruits (except bananas), and bran cereals.  Limit how much caffeine and chocolate you have. Do not use any spices or seasonings except salt.  During the next 24 hours  Slowly go back to your normal diet, as you feel better and your symptoms ease.  Date Last Reviewed: 8/1/2016  © 5312-7133 [employer]. [address] [place] [address], [place] [address]. All rights reserved. This information is not intended as a substitute for professional medical care. Always follow your healthcare professional's instructions.        Viral Syndrome (Adult)  A viral illness may cause a number of symptoms. The symptoms depend on the part of the body that the virus affects. If it settles in your nose, throat, and lungs, it may cause cough, sore throat, congestion, and sometimes headache. If it settles in your stomach and intestinal tract, it may cause vomiting and diarrhea. Sometimes it causes vague symptoms like "aching all over," feeling tired, loss of appetite, or fever.  A viral illness usually lasts 1 to 2 weeks, but sometimes it lasts longer. In some cases, a more serious infection can look like a viral syndrome in the first few days of the illness. You may need another exam and additional tests to know the " difference. Watch for the warning signs listed below.  Home care  Follow these guidelines for taking care of yourself at home:  · If symptoms are severe, rest at home for the first 2 to 3 days.  · Stay away from cigarette smoke - both your smoke and the smoke from others.  · You may use over-the-counter acetaminophen or ibuprofen for fever, muscle aching, and headache, unless another medicine was prescribed for this. If you have chronic liver or kidney disease or ever had a stomach ulcer or GI bleeding, talk with your doctor before using these medicines. No one who is younger than 18 and ill with a fever should take aspirin. It may cause severe disease or death.  · Your appetite may be poor, so a light diet is fine. Avoid dehydration by drinking 8 to 12 8-ounce glasses of fluids each day. This may include water; orange juice; lemonade; apple, grape, and cranberry juice; clear fruit drinks; electrolyte replacement and sports drinks; and decaffeinated teas and coffee. If you have been diagnosed with a kidney disease, ask your doctor how much and what types of fluids you should drink to prevent dehydration. If you have kidney disease, drinking too much fluid can cause it build up in the your body and be dangerous to your health.  · Over-the-counter remedies won't shorten the length of the illness but may be helpful for cough, sore throat; and nasal and sinus congestion. Don't use decongestants if you have high blood pressure.  Follow-up care  Follow up with your healthcare provider if you do not improve over the next week.  Call 911  Get emergency medical care if any of the following occur:  · Convulsion  · Feeling weak, dizzy, or like you are going to faint  · Chest pain, shortness of breath, wheezing, or difficulty breathing  When to seek medical advice  Call your healthcare provider right away if any of these occur:  · Cough with lots of colored sputum (mucus) or blood in your sputum  · Chest pain, shortness of  breath, wheezing, or difficulty breathing  · Severe headache; face, neck, or ear pain  · Severe, constant pain in the lower right side of your belly (abdominal)  · Continued vomiting (cant keep liquids down)  · Frequent diarrhea (more than 5 times a day); blood (red or black color) or mucus in diarrhea  · Feeling weak, dizzy, or like you are going to faint  · Extreme thirst  · Fever of 100.4°F (38°C) or higher, or as directed by your healthcare provider  Date Last Reviewed: 9/25/2015  © 5508-7726 [employer]. [address] [place] [address]. All rights reserved. This information is not intended as a substitute for professional medical care. Always follow your healthcare professional's instructions.

## 2019-11-18 NOTE — LETTER
November 18, 2019      [facility] [address], [address]  [facility] [address]  Phone: [phone]  Fax: [phone]       Patient: Abena Arango   YOB: 1995  Date of Visit: 11/18/2019    To Whom It May Concern:    Vincent Arango  was at [facility] on 11/18/2019. She may return to work/school on 11/20/2019 with no restrictions. If you have any questions or concerns, or if I can be of further assistance, please do not hesitate to contact me.    Sincerely,        Lydia Gallegos PA-C

## 2019-11-18 NOTE — PATIENT INSTRUCTIONS
You must understand that you've received an Urgent Care treatment only and that you may be released before all your medical problems are known or treated. You, the patient, will arrange for follow up care as instructed.  Follow up with your PCP or specialty clinic as directed if not improved or as needed. You can call [phone] to schedule an appointment with the appropriate provider.  If your condition worsens we recommend that you receive another evaluation at the Emergency Department for any concerns or worsening of condition.  Parent aware and verbalized understanding.    You tested NEGATIVE for flu today.  Patient aware and verbalized understanding.    Your symptoms are viral in nature.  Rest and hydrate with plenty of fluids.  Recommended very bland diet for the next 24-48 hours.   Avoid spicy foods and fast food/greasy food until symptoms have resolved.  Bentyl RX as needed for abdominal cramping/spasms.  Zofran and Phenergan RX as needed for nausea/vomiting.  OTC Tylenol or Ibuprofen every 4-6 hours as needed for headache, fever or pain.  Advised patient to follow-up appointment with PCP next week for further evaluation.  ER Precautions given to patient.  Patient aware and verbalized understanding.      Diet for Vomiting or Diarrhea (Adult)    Your symptoms may return or get worse after eating certain foods listed below. If this happens, stop eating these foods until your symptoms ease and you feel better.  Once the vomiting stops, follow the steps below.   During the first 12 to 24 hours  During the first 12 to 24 hours, follow this diet:  · Drinks. Plain water, sport drinks like electrolyte solutions, soft drinks without caffeine, mineral water (plain or flavored), clear fruit juices, and decaffeinated tea and coffee.  · Soups. Clear broth.  · Desserts. Plain gelatin, popsicles, and fruit juice bars. As you feel better, you may add 6 to 8 ounces of yogurt per day. If you have diarrhea, don't  "have foods or drinks that contain sugar, high-fructose corn syrup, or sugar alcohols.  During the next 24 hours  During the next 24 hours you may add the following to the above:  · Hot cereal, plain toast, bread, rolls, and crackers  · Plain noodles, rice, mashed potatoes, and chicken noodle or rice soup  · Unsweetened canned fruit (but not pineapple) and bananas  Don't eat more than 15 grams of fat a day. Do this by staying away from margarine, butter, oils, mayonnaise, sauces, gravies, fried foods, peanut butter, meat, poultry, and fish.  Don't eat much fiber. Stay away from raw or cooked vegetables, fresh fruits (except bananas), and bran cereals.  Limit how much caffeine and chocolate you have. Do not use any spices or seasonings except salt.  During the next 24 hours  Slowly go back to your normal diet, as you feel better and your symptoms ease.  Date Last Reviewed: 8/1/2016 © 2000-2017 [employer]. [address] [place] [address]. All rights reserved. This information is not intended as a substitute for professional medical care. Always follow your healthcare professional's instructions.        Viral Syndrome (Adult)  A viral illness may cause a number of symptoms. The symptoms depend on the part of the body that the virus affects. If it settles in your nose, throat, and lungs, it may cause cough, sore throat, congestion, and sometimes headache. If it settles in your stomach and intestinal tract, it may cause vomiting and diarrhea. Sometimes it causes vague symptoms like "aching all over," feeling tired, loss of appetite, or fever.  A viral illness usually lasts 1 to 2 weeks, but sometimes it lasts longer. In some cases, a more serious infection can look like a viral syndrome in the first few days of the illness. You may need another exam and additional tests to know the difference. Watch for the warning signs listed below.  Home care  Follow these guidelines for taking care of " yourself at home:  · If symptoms are severe, rest at home for the first 2 to 3 days.  · Stay away from cigarette smoke - both your smoke and the smoke from others.  · You may use over-the-counter acetaminophen or ibuprofen for fever, muscle aching, and headache, unless another medicine was prescribed for this. If you have chronic liver or kidney disease or ever had a stomach ulcer or GI bleeding, talk with your doctor before using these medicines. No one who is younger than 18 and ill with a fever should take aspirin. It may cause severe disease or death.  · Your appetite may be poor, so a light diet is fine. Avoid dehydration by drinking 8 to 12 8-ounce glasses of fluids each day. This may include water; orange juice; lemonade; apple, grape, and cranberry juice; clear fruit drinks; electrolyte replacement and sports drinks; and decaffeinated teas and coffee. If you have been diagnosed with a kidney disease, ask your doctor how much and what types of fluids you should drink to prevent dehydration. If you have kidney disease, drinking too much fluid can cause it build up in the your body and be dangerous to your health.  · Over-the-counter remedies won't shorten the length of the illness but may be helpful for cough, sore throat; and nasal and sinus congestion. Don't use decongestants if you have high blood pressure.  Follow-up care  Follow up with your healthcare provider if you do not improve over the next week.  Call 911  Get emergency medical care if any of the following occur:  · Convulsion  · Feeling weak, dizzy, or like you are going to faint  · Chest pain, shortness of breath, wheezing, or difficulty breathing  When to seek medical advice  Call your healthcare provider right away if any of these occur:  · Cough with lots of colored sputum (mucus) or blood in your sputum  · Chest pain, shortness of breath, wheezing, or difficulty breathing  · Severe headache; face, neck, or ear pain  · Severe, constant pain in  the lower right side of your belly (abdominal)  · Continued vomiting (cant keep liquids down)  · Frequent diarrhea (more than 5 times a day); blood (red or black color) or mucus in diarrhea  · Feeling weak, dizzy, or like you are going to faint  · Extreme thirst  · Fever of 100.4°F (38°C) or higher, or as directed by your healthcare provider  Date Last Reviewed: 9/25/2015  © 1957-1118 [employer]. [address] [place] [address]. All rights reserved. This information is not intended as a substitute for professional medical care. Always follow your healthcare professional's instructions.

## 2020-02-17 PROBLEM — K81.9 CHOLECYSTITIS: Status: ACTIVE | Noted: 2020-02-17

## 2020-02-19 PROBLEM — K83.1 OBSTRUCTIVE JAUNDICE: Status: ACTIVE | Noted: 2020-02-19

## 2020-02-19 PROBLEM — R10.11 RUQ ABDOMINAL PAIN: Status: ACTIVE | Noted: 2020-02-19

## 2020-02-20 PROBLEM — R10.11 RUQ ABDOMINAL PAIN: Status: RESOLVED | Noted: 2020-02-19 | Resolved: 2020-02-20

## 2020-02-20 PROBLEM — K81.9 CHOLECYSTITIS: Status: RESOLVED | Noted: 2020-02-17 | Resolved: 2020-02-20

## 2020-02-20 PROBLEM — K83.1 OBSTRUCTIVE JAUNDICE: Status: RESOLVED | Noted: 2020-02-19 | Resolved: 2020-02-20

## 2020-03-05 PROBLEM — R11.10 VOMITING: Status: RESOLVED | Noted: 2019-03-17 | Resolved: 2020-03-05

## 2020-03-05 PROBLEM — K59.00 CONSTIPATION: Status: RESOLVED | Noted: 2019-03-17 | Resolved: 2020-03-05

## 2021-01-11 ENCOUNTER — OFFICE VISIT (OUTPATIENT)
Dept: URGENT CARE | Facility: CLINIC | Age: 26
End: 2021-01-11
Payer: MEDICAID

## 2021-01-11 VITALS
HEART RATE: 87 BPM | SYSTOLIC BLOOD PRESSURE: 159 MMHG | BODY MASS INDEX: 37.22 KG/M2 | RESPIRATION RATE: 16 BRPM | TEMPERATURE: 98 F | DIASTOLIC BLOOD PRESSURE: 95 MMHG | WEIGHT: 260 LBS | OXYGEN SATURATION: 98 % | HEIGHT: 70 IN

## 2021-01-11 DIAGNOSIS — R11.10 VOMITING, INTRACTABILITY OF VOMITING NOT SPECIFIED, PRESENCE OF NAUSEA NOT SPECIFIED, UNSPECIFIED VOMITING TYPE: Primary | ICD-10-CM

## 2021-01-11 DIAGNOSIS — E86.0 MILD DEHYDRATION: ICD-10-CM

## 2021-01-11 LAB
B-HCG UR QL: NEGATIVE
BILIRUB UR QL STRIP: NEGATIVE
CTP QC/QA: YES
CTP QC/QA: YES
GLUCOSE UR QL STRIP: NEGATIVE
KETONES UR QL STRIP: NEGATIVE
LEUKOCYTE ESTERASE UR QL STRIP: NEGATIVE
PH, POC UA: 6.5 (ref 5–8)
POC BLOOD, URINE: NEGATIVE
POC NITRATES, URINE: NEGATIVE
PROT UR QL STRIP: POSITIVE
SARS-COV-2 RDRP RESP QL NAA+PROBE: NEGATIVE
SP GR UR STRIP: 1.02 (ref 1–1.03)
UROBILINOGEN UR STRIP-ACNC: NORMAL (ref 0.1–1.1)

## 2021-01-11 PROCEDURE — 81003 POCT URINALYSIS, DIPSTICK, AUTOMATED, W/O SCOPE: ICD-10-PCS | Mod: QW,S$GLB,, | Performed by: PHYSICIAN ASSISTANT

## 2021-01-11 PROCEDURE — 81003 URINALYSIS AUTO W/O SCOPE: CPT | Mod: QW,S$GLB,, | Performed by: PHYSICIAN ASSISTANT

## 2021-01-11 PROCEDURE — 99214 OFFICE O/P EST MOD 30 MIN: CPT | Mod: 25,S$GLB,, | Performed by: PHYSICIAN ASSISTANT

## 2021-01-11 PROCEDURE — U0002 COVID-19 LAB TEST NON-CDC: HCPCS | Mod: QW,S$GLB,, | Performed by: PHYSICIAN ASSISTANT

## 2021-01-11 PROCEDURE — 99214 PR OFFICE/OUTPT VISIT, EST, LEVL IV, 30-39 MIN: ICD-10-PCS | Mod: 25,S$GLB,, | Performed by: PHYSICIAN ASSISTANT

## 2021-01-11 PROCEDURE — U0002: ICD-10-PCS | Mod: QW,S$GLB,, | Performed by: PHYSICIAN ASSISTANT

## 2021-01-11 RX ORDER — ONDANSETRON 8 MG/1
8 TABLET, ORALLY DISINTEGRATING ORAL EVERY 8 HOURS PRN
Qty: 20 TABLET | Refills: 0 | Status: SHIPPED | OUTPATIENT
Start: 2021-01-11 | End: 2021-01-16

## 2021-01-11 RX ORDER — PROMETHAZINE HYDROCHLORIDE 25 MG/ML
25 INJECTION, SOLUTION INTRAMUSCULAR; INTRAVENOUS
Status: COMPLETED | OUTPATIENT
Start: 2021-01-11 | End: 2021-01-11

## 2021-01-11 RX ADMIN — PROMETHAZINE HYDROCHLORIDE 25 MG: 25 INJECTION, SOLUTION INTRAMUSCULAR; INTRAVENOUS at 12:01

## 2021-04-13 ENCOUNTER — LAB VISIT (OUTPATIENT)
Dept: LAB | Facility: OTHER | Age: 26
End: 2021-04-13
Payer: MEDICAID

## 2021-04-13 DIAGNOSIS — Z20.822 ENCOUNTER FOR LABORATORY TESTING FOR COVID-19 VIRUS: ICD-10-CM

## 2021-04-13 PROCEDURE — U0003 INFECTIOUS AGENT DETECTION BY NUCLEIC ACID (DNA OR RNA); SEVERE ACUTE RESPIRATORY SYNDROME CORONAVIRUS 2 (SARS-COV-2) (CORONAVIRUS DISEASE [COVID-19]), AMPLIFIED PROBE TECHNIQUE, MAKING USE OF HIGH THROUGHPUT TECHNOLOGIES AS DESCRIBED BY CMS-2020-01-R: HCPCS | Performed by: NURSE PRACTITIONER

## 2021-04-14 LAB — SARS-COV-2 RNA RESP QL NAA+PROBE: NOT DETECTED

## 2021-05-02 ENCOUNTER — OFFICE VISIT (OUTPATIENT)
Dept: URGENT CARE | Facility: CLINIC | Age: 26
End: 2021-05-02
Payer: MEDICAID

## 2021-05-02 VITALS
SYSTOLIC BLOOD PRESSURE: 134 MMHG | HEIGHT: 69 IN | DIASTOLIC BLOOD PRESSURE: 89 MMHG | HEART RATE: 106 BPM | RESPIRATION RATE: 16 BRPM | OXYGEN SATURATION: 99 % | WEIGHT: 250 LBS | BODY MASS INDEX: 37.03 KG/M2 | TEMPERATURE: 98 F

## 2021-05-02 DIAGNOSIS — F41.9 ANXIETY: ICD-10-CM

## 2021-05-02 DIAGNOSIS — R11.2 NAUSEA AND VOMITING, INTRACTABILITY OF VOMITING NOT SPECIFIED, UNSPECIFIED VOMITING TYPE: Primary | ICD-10-CM

## 2021-05-02 PROCEDURE — 99214 OFFICE O/P EST MOD 30 MIN: CPT | Mod: S$GLB,,, | Performed by: NURSE PRACTITIONER

## 2021-05-02 PROCEDURE — 99214 PR OFFICE/OUTPT VISIT, EST, LEVL IV, 30-39 MIN: ICD-10-PCS | Mod: S$GLB,,, | Performed by: NURSE PRACTITIONER

## 2021-05-02 RX ORDER — PROMETHAZINE HYDROCHLORIDE 25 MG/1
25 SUPPOSITORY RECTAL EVERY 6 HOURS PRN
Qty: 6 SUPPOSITORY | Refills: 0 | OUTPATIENT
Start: 2021-05-02 | End: 2023-01-23

## 2021-05-02 RX ORDER — DICYCLOMINE HYDROCHLORIDE 10 MG/1
10 CAPSULE ORAL
COMMUNITY
End: 2023-12-11

## 2021-05-02 RX ORDER — HYDROXYZINE PAMOATE 25 MG/1
25 CAPSULE ORAL 2 TIMES DAILY PRN
Qty: 20 CAPSULE | Refills: 0 | Status: SHIPPED | OUTPATIENT
Start: 2021-05-02 | End: 2021-05-07

## 2021-05-05 ENCOUNTER — TELEPHONE (OUTPATIENT)
Dept: URGENT CARE | Facility: CLINIC | Age: 26
End: 2021-05-05

## 2021-09-29 ENCOUNTER — OFFICE VISIT (OUTPATIENT)
Dept: URGENT CARE | Facility: CLINIC | Age: 26
End: 2021-09-29
Payer: MEDICAID

## 2021-09-29 VITALS
WEIGHT: 250 LBS | OXYGEN SATURATION: 97 % | TEMPERATURE: 100 F | SYSTOLIC BLOOD PRESSURE: 133 MMHG | DIASTOLIC BLOOD PRESSURE: 92 MMHG | BODY MASS INDEX: 36.92 KG/M2 | HEART RATE: 94 BPM

## 2021-09-29 DIAGNOSIS — R11.10 VOMITING, INTRACTABILITY OF VOMITING NOT SPECIFIED, PRESENCE OF NAUSEA NOT SPECIFIED, UNSPECIFIED VOMITING TYPE: ICD-10-CM

## 2021-09-29 DIAGNOSIS — R11.0 NAUSEA: Primary | ICD-10-CM

## 2021-09-29 PROCEDURE — 99214 PR OFFICE/OUTPT VISIT, EST, LEVL IV, 30-39 MIN: ICD-10-PCS | Mod: S$GLB,,, | Performed by: FAMILY MEDICINE

## 2021-09-29 PROCEDURE — 99214 OFFICE O/P EST MOD 30 MIN: CPT | Mod: S$GLB,,, | Performed by: FAMILY MEDICINE

## 2021-09-29 RX ORDER — PROMETHAZINE HYDROCHLORIDE 25 MG/ML
25 INJECTION, SOLUTION INTRAMUSCULAR; INTRAVENOUS
Status: COMPLETED | OUTPATIENT
Start: 2021-09-29 | End: 2021-09-29

## 2021-09-29 RX ORDER — PROMETHAZINE HYDROCHLORIDE 25 MG/1
25 SUPPOSITORY RECTAL EVERY 6 HOURS PRN
Qty: 30 SUPPOSITORY | Refills: 1 | OUTPATIENT
Start: 2021-09-29 | End: 2023-01-23

## 2021-09-29 RX ADMIN — PROMETHAZINE HYDROCHLORIDE 25 MG: 25 INJECTION, SOLUTION INTRAMUSCULAR; INTRAVENOUS at 09:09

## 2023-01-31 ENCOUNTER — TELEPHONE (OUTPATIENT)
Dept: OBSTETRICS AND GYNECOLOGY | Facility: CLINIC | Age: 28
End: 2023-01-31
Payer: MEDICAID

## 2023-01-31 NOTE — TELEPHONE ENCOUNTER
----- Message from Sagrario Lujan sent at 1/31/2023  8:56 AM CST -----  Contact: Pt  Type:  Sooner Appointment Request    Caller is requesting a sooner appointment.  Caller declined first available appointment listed below.  Caller will not accept being placed on the waitlist and is requesting a message be sent to doctor.    Name of Caller:  Pt  When is the first available appointment?  3/2  Symptoms: ER Follow Up//Cyst on Ovaries  Best Call Back Number:  [phone]    Additional Information:  Pt was calling to [facility] with a New Provider pt stated she is currently dealing with a lot of pain caused by cyst pt stated to please call if they can be squeezed in sooner Thank you

## 2023-01-31 NOTE — TELEPHONE ENCOUNTER
Spoke with patient. Offered 2- at 9:40 am, appt rescheduled from 3-2-2023. Patient verb understanding.

## 2023-05-13 ENCOUNTER — OFFICE VISIT (OUTPATIENT)
Dept: URGENT CARE | Facility: CLINIC | Age: 28
End: 2023-05-13
Payer: MEDICAID

## 2023-05-13 VITALS
SYSTOLIC BLOOD PRESSURE: 136 MMHG | HEART RATE: 100 BPM | WEIGHT: 239 LBS | TEMPERATURE: 99 F | BODY MASS INDEX: 34.22 KG/M2 | RESPIRATION RATE: 16 BRPM | DIASTOLIC BLOOD PRESSURE: 97 MMHG | OXYGEN SATURATION: 97 % | HEIGHT: 70 IN

## 2023-05-13 DIAGNOSIS — R11.2 INTRACTABLE NAUSEA AND VOMITING: Primary | ICD-10-CM

## 2023-05-13 LAB
B-HCG UR QL: NEGATIVE
BILIRUB UR QL STRIP: NEGATIVE
CTP QC/QA: YES
GLUCOSE UR QL STRIP: NEGATIVE
KETONES UR QL STRIP: POSITIVE
LEUKOCYTE ESTERASE UR QL STRIP: NEGATIVE
PH, POC UA: 5.5 (ref 5–8)
POC BLOOD, URINE: NEGATIVE
POC NITRATES, URINE: NEGATIVE
PROT UR QL STRIP: POSITIVE
SP GR UR STRIP: 1.02 (ref 1–1.03)
UROBILINOGEN UR STRIP-ACNC: NORMAL (ref 0.1–1.1)

## 2023-05-13 PROCEDURE — 81003 URINALYSIS AUTO W/O SCOPE: CPT | Mod: QW,S$GLB,, | Performed by: PHYSICIAN ASSISTANT

## 2023-05-13 PROCEDURE — 99213 OFFICE O/P EST LOW 20 MIN: CPT | Mod: S$GLB,,, | Performed by: PHYSICIAN ASSISTANT

## 2023-05-13 PROCEDURE — 99213 PR OFFICE/OUTPT VISIT, EST, LEVL III, 20-29 MIN: ICD-10-PCS | Mod: S$GLB,,, | Performed by: PHYSICIAN ASSISTANT

## 2023-05-13 PROCEDURE — 81025 URINE PREGNANCY TEST: CPT | Mod: S$GLB,,, | Performed by: PHYSICIAN ASSISTANT

## 2023-05-13 PROCEDURE — 81025 POCT URINE PREGNANCY: ICD-10-PCS | Mod: S$GLB,,, | Performed by: PHYSICIAN ASSISTANT

## 2023-05-13 PROCEDURE — 81003 POCT URINALYSIS, DIPSTICK, AUTOMATED, W/O SCOPE: ICD-10-PCS | Mod: QW,S$GLB,, | Performed by: PHYSICIAN ASSISTANT

## 2023-05-13 RX ORDER — PROMETHAZINE HYDROCHLORIDE 25 MG/1
25 TABLET ORAL EVERY 6 HOURS PRN
Qty: 30 TABLET | Refills: 0 | Status: SHIPPED | OUTPATIENT
Start: 2023-05-13 | End: 2023-05-23

## 2023-05-13 RX ORDER — PROMETHAZINE HYDROCHLORIDE 25 MG/ML
25 INJECTION, SOLUTION INTRAMUSCULAR; INTRAVENOUS
Status: COMPLETED | OUTPATIENT
Start: 2023-05-13 | End: 2023-05-13

## 2023-05-13 RX ADMIN — PROMETHAZINE HYDROCHLORIDE 25 MG: 25 INJECTION, SOLUTION INTRAMUSCULAR; INTRAVENOUS at 02:05

## 2023-05-13 NOTE — PATIENT INSTRUCTIONS
Patient Education       Nausea and Vomiting, Adult   About this topic   When you feel sick to your stomach, this is nausea. When you throw up, this is vomiting. Often, nausea and vomiting are caused by a virus. But they can also be caused by more serious things like an infection around the brain. The staff felt the risk of a serious cause for your nausea and vomiting is low.  If a virus is causing your nausea and vomiting, it is easy to spread from person to person. You can lower this risk by washing your hands often. Most of the time, your symptoms will go away without treatment in a few days.     What are the causes?   Many illnesses may cause you to feel sick to your stomach or to throw up. Sometimes, the illness may be related to your belly. You may have an infection like the flu or food poisoning. Your belly may be bothered from ulcers or reflux. A problem with other organs in your belly like your gallbladder, liver, or appendix may make you feel sick to your stomach. You may also feel sick if your belly does not feel well or there is a block.  Other health problems that are not directly related to your belly may cause you the same feelings. You may feel sick to your stomach and like you need to throw up if you have motion sickness or an inner ear problem. Very bad headaches, migraines, and some drugs can make you feel this way as well. Someone who has had too much alcohol to drink or who has misused drugs may vomit or feel sick. Some people have nausea or vomiting from an accident, head injury, or as a side effect of surgery or chemo.  What are the main signs?   Having an upset stomach and throwing up are the main signs. You may also feel tired, have a fever, and a sore belly. Other signs will depend on what is causing the nausea and vomiting.  How does the doctor diagnose this health problem?   Your doctor will take your history. You will be asked questions about your problem and when it happens. You may be  given an exam to see how much fluid you have in your body. Your doctor may check to see if there are signs of an infection or pain.   Your doctor may order:  Lab tests  Pregnancy test  X-rays  Ultrasound  CT or MRI scan  Endoscopy  How does the doctor treat this health problem?   The treatment for nausea and vomiting will depend on the cause. Sometimes, an exact problem is found, like an infection. It can be treated with drugs. This will also help the nausea and vomiting. Other times, you may be given intravenous (IV) fluids. Often, just getting fluids will help you feel better.  Are there other health problems to treat?   There may be other problems to treat based on the cause of your nausea and vomiting.  What drugs may be needed?   The doctor may order drugs to:  Stop the vomiting  Lower fever  Help an upset stomach  What problems could happen?   Too much fluid loss. This is dehydration.  Weight loss  What can be done to prevent this health problem?   Wash your hands often with soap and water for at least 20 seconds, especially after coughing or sneezing. Alcohol-based hand sanitizers also work to kill germs.  If you are sick, cover your mouth and nose with tissue when coughing or sneezing. You can also cough into your elbow. Throw away tissues in the trash and wash your hands after touching used tissues.  Do not get close to, hug, or kiss people who are sick.  Avoid sharing your towels, tissues, food, or drink with anyone who is sick.  Clean things you handle often like door handles, remotes, toys, and phones. Wipe them with a disinfectant.  Stay away from crowded places.  Last Reviewed Date   2021-06-18  Consumer Information Use and Disclaimer   This information is not specific medical advice and does not replace information you receive from your health care provider. This is only a brief summary of general information. It does NOT include all information about conditions, illnesses, injuries, tests,  procedures, treatments, therapies, discharge instructions or life-style choices that may apply to you. You must talk with your health care provider for complete information about your health and treatment options. This information should not be used to decide whether or not to accept your health care providers advice, instructions or recommendations. Only your health care provider has the knowledge and training to provide advice that is right for you.  Copyright   Copyright © 2021 [employer] and its affiliates and/or licensors. All rights reserved.    Discussed with patient viral versus bacterial infection.  Patient advised to increase fluids.  Alternate Motrin and Tylenol every 4 hours.  Monitor for increased fever or worsening of symptoms.  Follow-up with PCP in 1 week.  Return to clinic if necessary.

## 2023-05-13 NOTE — LETTER
May 13, 2023      [facility] - [place] [address], [address]  [facility] [address]  Phone: [phone]  Fax: [phone]       Patient: Abena Arango   YOB: 1995  Date of Visit: 05/13/2023    To Whom It May Concern:    Vincnet Arango  was at [facility] on 05/13/2023. She may return to work/school on 05/14/2023 without restrictions. If you have any questions or concerns, or if I can be of further assistance, please do not hesitate to contact me.    Sincerely,    EVELIO Deleon

## 2023-05-13 NOTE — PROGRESS NOTES
"Subjective:      Patient ID: Abena Arango is a 27 y.o. female.    Vitals:  height is 5' 10" (1.778 m) and weight is 108.4 kg (239 lb). Her oral temperature is 99 °F (37.2 °C). Her pulse is 100. Her respiration is 16 and oxygen saturation is 97%.     Chief Complaint: Emesis    Pt presents to clinic for intractable vomiting.  Pt states it began on Thursday. Pt states this happens every few months. Pt has seen GI and been scoped before. Pt recently seen at hospital in January for same issue.    Emesis   Associated symptoms include chills. Pertinent negatives include no abdominal pain, arthralgias, chest pain, coughing, diarrhea, dizziness, fever, headaches or myalgias.     Constitution: Positive for appetite change, chills and sweating. Negative for fatigue and fever.   HENT:  Negative for ear pain, drooling, congestion, sore throat, trouble swallowing and voice change.    Neck: Negative for neck pain, neck stiffness, painful lymph nodes and neck swelling.   Cardiovascular:  Negative for chest pain, leg swelling, palpitations, sob on exertion and passing out.   Eyes:  Negative for eye pain, eye redness, photophobia, double vision, blurred vision and eyelid swelling.   Respiratory:  Negative for chest tightness, cough, sputum production, bloody sputum, shortness of breath, stridor and wheezing.    Gastrointestinal:  Positive for nausea and vomiting. Negative for abdominal pain, abdominal bloating, constipation, diarrhea and heartburn.   Musculoskeletal:  Negative for joint pain, joint swelling, abnormal ROM of joint, back pain, muscle cramps and muscle ache.   Skin:  Negative for rash and hives.   Allergic/Immunologic: Negative for seasonal allergies, food allergies, hives, itching and sneezing.   Neurological:  Negative for dizziness, light-headedness, passing out, loss of balance, headaches, altered mental status, loss of consciousness and seizures.   Hematologic/Lymphatic: Negative for swollen lymph nodes. "   Psychiatric/Behavioral:  Negative for altered mental status and nervous/anxious. The patient is not nervous/anxious.     Objective:     Physical Exam   Constitutional: She is oriented to person, place, and time. She appears well-developed.   HENT:   Head: Normocephalic and atraumatic.   Ears:   Right Ear: External ear normal.   Left Ear: External ear normal.   Nose: Nose normal.   Mouth/Throat: Mucous membranes are normal.   Eyes: Conjunctivae and lids are normal.   Neck: Trachea normal. Neck supple.   Cardiovascular: Normal rate, regular rhythm and normal heart sounds.   Pulmonary/Chest: Effort normal and breath sounds normal. No respiratory distress.   Abdominal: Normal appearance and bowel sounds are normal. She exhibits no distension and no mass. Soft. There is abdominal tenderness in the epigastric area.      Comments: Pt doubled over in pain with nausea and vomiting.    Musculoskeletal: Normal range of motion.         General: Normal range of motion.   Neurological: She is alert and oriented to person, place, and time. She has normal strength.   Skin: Skin is warm, dry, intact, not diaphoretic and not pale.   Psychiatric: Her speech is normal and behavior is normal. Judgment and thought content normal.   Nursing note and vitals reviewed.    Assessment:     1. Intractable nausea and vomiting        Plan:       Intractable nausea and vomiting  -     COMPREHENSIVE METABOLIC PANEL; Future; Expected date: 05/13/2023  -     AMYLASE; Future; Expected date: 05/13/2023  -     LIPASE; Future; Expected date: 05/13/2023  -     CBC W/ AUTO DIFFERENTIAL; Future; Expected date: 05/13/2023    Other orders  -     promethazine injection 25 mg        Patient Instructions   Patient Education       Nausea and Vomiting, Adult   About this topic   When you feel sick to your stomach, this is nausea. When you throw up, this is vomiting. Often, nausea and vomiting are caused by a virus. But they can also be caused by more serious  things like an infection around the brain. The staff felt the risk of a serious cause for your nausea and vomiting is low.  If a virus is causing your nausea and vomiting, it is easy to spread from person to person. You can lower this risk by washing your hands often. Most of the time, your symptoms will go away without treatment in a few days.     What are the causes?   Many illnesses may cause you to feel sick to your stomach or to throw up. Sometimes, the illness may be related to your belly. You may have an infection like the flu or food poisoning. Your belly may be bothered from ulcers or reflux. A problem with other organs in your belly like your gallbladder, liver, or appendix may make you feel sick to your stomach. You may also feel sick if your belly does not feel well or there is a block.  Other health problems that are not directly related to your belly may cause you the same feelings. You may feel sick to your stomach and like you need to throw up if you have motion sickness or an inner ear problem. Very bad headaches, migraines, and some drugs can make you feel this way as well. Someone who has had too much alcohol to drink or who has misused drugs may vomit or feel sick. Some people have nausea or vomiting from an accident, head injury, or as a side effect of surgery or chemo.  What are the main signs?   Having an upset stomach and throwing up are the main signs. You may also feel tired, have a fever, and a sore belly. Other signs will depend on what is causing the nausea and vomiting.  How does the doctor diagnose this health problem?   Your doctor will take your history. You will be asked questions about your problem and when it happens. You may be given an exam to see how much fluid you have in your body. Your doctor may check to see if there are signs of an infection or pain.   Your doctor may order:  Lab tests  Pregnancy test  X-rays  Ultrasound  CT or MRI scan  Endoscopy  How does the doctor  treat this health problem?   The treatment for nausea and vomiting will depend on the cause. Sometimes, an exact problem is found, like an infection. It can be treated with drugs. This will also help the nausea and vomiting. Other times, you may be given intravenous (IV) fluids. Often, just getting fluids will help you feel better.  Are there other health problems to treat?   There may be other problems to treat based on the cause of your nausea and vomiting.  What drugs may be needed?   The doctor may order drugs to:  Stop the vomiting  Lower fever  Help an upset stomach  What problems could happen?   Too much fluid loss. This is dehydration.  Weight loss  What can be done to prevent this health problem?   Wash your hands often with soap and water for at least 20 seconds, especially after coughing or sneezing. Alcohol-based hand sanitizers also work to kill germs.  If you are sick, cover your mouth and nose with tissue when coughing or sneezing. You can also cough into your elbow. Throw away tissues in the trash and wash your hands after touching used tissues.  Do not get close to, hug, or kiss people who are sick.  Avoid sharing your towels, tissues, food, or drink with anyone who is sick.  Clean things you handle often like door handles, remotes, toys, and phones. Wipe them with a disinfectant.  Stay away from crowded places.  Last Reviewed Date   2021-06-18  Consumer Information Use and Disclaimer   This information is not specific medical advice and does not replace information you receive from your health care provider. This is only a brief summary of general information. It does NOT include all information about conditions, illnesses, injuries, tests, procedures, treatments, therapies, discharge instructions or life-style choices that may apply to you. You must talk with your health care provider for complete information about your health and treatment options. This information should not be used to decide  whether or not to accept your health care providers advice, instructions or recommendations. Only your health care provider has the knowledge and training to provide advice that is right for you.  Copyright   Copyright © 2021 [employer] and its affiliates and/or licensors. All rights reserved.    Discussed with patient viral versus bacterial infection.  Patient advised to increase fluids.  Alternate Motrin and Tylenol every 4 hours.  Monitor for increased fever or worsening of symptoms.  Follow-up with PCP in 1 week.  Return to clinic if necessary.

## 2023-05-13 NOTE — PROGRESS NOTES
"Subjective:      Patient ID: Abena Arango is a 27 y.o. female.    Vitals:  height is 5' 10" (1.778 m) and weight is 108.4 kg (239 lb). Her oral temperature is 99 °F (37.2 °C). Her pulse is 100. Her respiration is 16 and oxygen saturation is 97%.     Chief Complaint: Emesis    Patient presents today with nausea and vomiting that began 2 days ago.  Patient has history of nausea/vomiting.  Patient has taken Protonix, with no relief.  Patient also complains of abdominal pain that radiates to her back.   Patient cannot keep food or drink down.     Emesis   This is a new problem. The current episode started in the past 7 days. The problem occurs 5 to 10 times per day. The emesis has an appearance of bile. Associated symptoms include dizziness. Treatments tried: protonix.     Gastrointestinal:  Positive for vomiting.   Neurological:  Positive for dizziness.    Objective:     Physical Exam    Assessment:     1. Intractable nausea and vomiting        Plan:       Intractable nausea and vomiting  -     COMPREHENSIVE METABOLIC PANEL; Future; Expected date: 05/13/2023  -     AMYLASE; Future; Expected date: 05/13/2023  -     LIPASE; Future; Expected date: 05/13/2023  -     CBC W/ AUTO DIFFERENTIAL; Future; Expected date: 05/13/2023    Other orders  -     promethazine injection 25 mg  -     promethazine (PHENERGAN) 25 MG tablet; Take 1 tablet (25 mg total) by mouth every 6 (six) hours as needed for Nausea.  Dispense: 30 tablet; Refill: 0                    "

## 2024-01-17 ENCOUNTER — OFFICE VISIT (OUTPATIENT)
Dept: URGENT CARE | Facility: CLINIC | Age: 29
End: 2024-01-17
Payer: MEDICAID

## 2024-01-17 VITALS
OXYGEN SATURATION: 98 % | HEART RATE: 105 BPM | SYSTOLIC BLOOD PRESSURE: 138 MMHG | RESPIRATION RATE: 15 BRPM | HEIGHT: 69 IN | WEIGHT: 250 LBS | DIASTOLIC BLOOD PRESSURE: 82 MMHG | TEMPERATURE: 98 F | BODY MASS INDEX: 37.03 KG/M2

## 2024-01-17 DIAGNOSIS — R11.2 NAUSEA AND VOMITING, UNSPECIFIED VOMITING TYPE: Primary | ICD-10-CM

## 2024-01-17 PROCEDURE — 99213 OFFICE O/P EST LOW 20 MIN: CPT | Mod: S$GLB,,, | Performed by: PHYSICIAN ASSISTANT

## 2024-01-17 RX ORDER — ONDANSETRON 4 MG/1
4 TABLET, ORALLY DISINTEGRATING ORAL EVERY 8 HOURS PRN
Qty: 30 TABLET | Refills: 0 | Status: SHIPPED | OUTPATIENT
Start: 2024-01-17

## 2024-01-17 RX ORDER — PROMETHAZINE HYDROCHLORIDE 12.5 MG/1
12.5 TABLET ORAL EVERY 6 HOURS PRN
Qty: 20 TABLET | Refills: 0 | Status: SHIPPED | OUTPATIENT
Start: 2024-01-17

## 2024-01-17 RX ORDER — PROMETHAZINE HYDROCHLORIDE 25 MG/ML
12.5 INJECTION, SOLUTION INTRAMUSCULAR; INTRAVENOUS ONCE
Status: DISCONTINUED | OUTPATIENT
Start: 2024-01-17 | End: 2024-01-17

## 2024-01-17 RX ORDER — PROMETHAZINE HYDROCHLORIDE 25 MG/ML
12.5 INJECTION, SOLUTION INTRAMUSCULAR; INTRAVENOUS
Status: COMPLETED | OUTPATIENT
Start: 2024-01-17 | End: 2024-01-17

## 2024-01-17 RX ADMIN — PROMETHAZINE HYDROCHLORIDE 12.5 MG: 25 INJECTION, SOLUTION INTRAMUSCULAR; INTRAVENOUS at 06:01

## 2024-01-17 NOTE — LETTER
January 17, 2024      [facility] - [place] [address], [address]  [facility] [address]  Phone: [phone]  Fax: [phone]       Patient: Abena Arango   YOB: 1995  Date of Visit: 01/17/2024    To Whom It May Concern:    Vincent Arango  was at [facility] on 01/17/2024. The patient may return to work/school on 01/20/2024 with no restrictions. If you have any questions or concerns, or if I can be of further assistance, please do not hesitate to contact me.    Sincerely,      Lydia Gallegos PA-C

## 2024-01-18 NOTE — PROGRESS NOTES
"Subjective:      Patient ID: Abena Arango is a 28 y.o. female.    Vitals:  height is 5' 9" (1.753 m) and weight is 113.4 kg (250 lb). Her temperature is 97.8 °F (36.6 °C). Her blood pressure is 138/82 and her pulse is 105. Her respiration is 15 and oxygen saturation is 98%.     Chief Complaint: Nausea    Patient presents to clinic with complaint of nausea and abdominal pain. Symptoms started 6 days ago.     Nausea  This is a new problem. The current episode started in the past 7 days. The problem occurs constantly. The problem has been unchanged. Associated symptoms include abdominal pain and nausea. Pertinent negatives include no anorexia, arthralgias, change in bowel habit, chest pain, chills, congestion, coughing, diaphoresis, fatigue, fever, headaches, joint swelling, myalgias, neck pain, numbness, rash, sore throat, swollen glands, urinary symptoms, vertigo, visual change, vomiting or weakness.     Constitution: Negative for chills, sweating, fatigue and fever.   HENT:  Negative for congestion and sore throat.    Neck: Negative for neck pain.   Cardiovascular:  Negative for chest pain.   Respiratory:  Negative for cough.    Gastrointestinal:  Positive for abdominal pain and nausea. Negative for vomiting.   Musculoskeletal:  Negative for joint pain, joint swelling and muscle ache.   Skin:  Negative for rash.   Neurological:  Negative for history of vertigo, headaches and numbness.    Objective:     Physical Exam    Assessment:     1. Nausea and vomiting, unspecified vomiting type        Plan:       Nausea and vomiting, unspecified vomiting type  -     Ambulatory referral/consult to Gastroenterology    Other orders  -     promethazine injection 12.5 mg  -     promethazine (PHENERGAN) 12.5 MG Tab; Take 1 tablet (12.5 mg total) by mouth every 6 (six) hours as needed (for nausea). Will cause drowsiness.  Dispense: 20 tablet; Refill: 0  -     ondansetron (ZOFRAN-ODT) 4 MG TbDL; Take 1 tablet (4 mg total) by mouth " every 8 (eight) hours as needed (nausea).  Dispense: 30 tablet; Refill: 0

## 2024-01-18 NOTE — PROGRESS NOTES
"Subjective:      Patient ID: Abena Arango is a 28 y.o. female.    Vitals:  height is 5' 9" (1.753 m) and weight is 113.4 kg (250 lb). Her temperature is 97.8 °F (36.6 °C). Her blood pressure is 138/82 and her pulse is 105. Her respiration is 15 and oxygen saturation is 98%.     Chief Complaint: Nausea    Nausea  This is a recurrent problem. The current episode started in the past 7 days. The problem occurs intermittently. The problem has been gradually worsening. Associated symptoms include abdominal pain, nausea and vomiting. Pertinent negatives include no anorexia, arthralgias, change in bowel habit, chest pain, chills, congestion, coughing, diaphoresis, fatigue, fever, headaches, joint swelling, myalgias, neck pain, numbness, rash, sore throat, swollen glands, urinary symptoms, visual change or weakness. Nothing aggravates the symptoms. She has tried nothing for the symptoms.         Constitution: Negative for chills, sweating, fatigue and fever.   HENT:  Negative for ear pain, drooling, congestion, sore throat, trouble swallowing and voice change.    Neck: Negative for neck pain, neck stiffness, painful lymph nodes and neck swelling.   Cardiovascular:  Negative for chest pain, leg swelling, palpitations, sob on exertion and passing out.   Eyes:  Negative for eye pain, eye redness, photophobia, double vision, blurred vision and eyelid swelling.   Respiratory:  Negative for chest tightness, cough, sputum production, bloody sputum, shortness of breath, stridor and wheezing.    Gastrointestinal:  Positive for abdominal pain, nausea and vomiting. Negative for abdominal bloating, constipation, diarrhea, bright red blood in stool, dark colored stools, rectal bleeding and heartburn.   Genitourinary:  Negative for dysuria, frequency, urgency, flank pain, hematuria and pelvic pain.   Musculoskeletal:  Negative for joint pain, joint swelling, abnormal ROM of joint, back pain, muscle cramps and muscle ache.   Skin:  " Negative for rash and hives.   Allergic/Immunologic: Negative for seasonal allergies, food allergies, hives, itching and sneezing.   Neurological:  Negative for dizziness, light-headedness, passing out, facial drooping, speech difficulty, loss of balance, headaches, altered mental status, loss of consciousness, numbness and seizures.   Hematologic/Lymphatic: Negative for swollen lymph nodes.   Psychiatric/Behavioral:  Negative for altered mental status and nervous/anxious. The patient is not nervous/anxious.       Objective:     Physical Exam   Constitutional: She is oriented to person, place, and time. She appears well-developed. She is cooperative.  Non-toxic appearance. She does not appear ill. No distress.   HENT:   Head: Normocephalic and atraumatic.   Ears:   Right Ear: External ear normal.   Left Ear: External ear normal.   Nose: Nose normal. No nasal deformity. No epistaxis.   Mouth/Throat: Uvula is midline, oropharynx is clear and moist and mucous membranes are normal.   Eyes: Conjunctivae and lids are normal. No scleral icterus.   Neck: Trachea normal and phonation normal. Neck supple. No edema present. No erythema present. No neck rigidity present.   Cardiovascular: Normal rate, regular rhythm, normal heart sounds and normal pulses.   Pulmonary/Chest: Effort normal and breath sounds normal. No accessory muscle usage or stridor. No respiratory distress. She has no decreased breath sounds. She has no wheezes. She has no rhonchi. She has no rales.   Abdominal: Normal appearance and bowel sounds are normal. Soft. There is no abdominal tenderness. There is no rebound, no guarding, no left CVA tenderness and no right CVA tenderness.   Musculoskeletal: Normal range of motion.         General: No deformity. Normal range of motion.   Neurological: She is alert and oriented to person, place, and time. She exhibits normal muscle tone. Coordination normal.   Skin: Skin is warm, dry, intact, not diaphoretic, not pale  and no rash. Capillary refill takes less than 2 seconds.   Psychiatric: Her speech is normal and behavior is normal. Judgment and thought content normal.   Nursing note and vitals reviewed.    Assessment:     1. Nausea and vomiting, unspecified vomiting type        Plan:   Patient reports that she is concerned about gastroparesis and is requesting new GI referral in clinic today. Patient has a  in the waiting room with her, so will go ahead with Phenergan IM at this time - will cause drowsiness. Advised close follow-up with PCP and/or Specialist for further evaluation as needed. Strict ER precautions given to patient as well. Patient aware, verbalized understanding and agreed with plan of care.    Nausea and vomiting, unspecified vomiting type  -     Ambulatory referral/consult to Gastroenterology    Other orders  -     Discontinue: promethazine injection 12.5 mg  -     promethazine (PHENERGAN) 12.5 MG Tab; Take 1 tablet (12.5 mg total) by mouth every 6 (six) hours as needed (for nausea). Will cause drowsiness.  Dispense: 20 tablet; Refill: 0  -     ondansetron (ZOFRAN-ODT) 4 MG TbDL; Take 1 tablet (4 mg total) by mouth every 8 (eight) hours as needed (nausea).  Dispense: 30 tablet; Refill: 0  -     promethazine injection 12.5 mg    There are no Patient Instructions on file for this visit.